# Patient Record
Sex: FEMALE | Race: WHITE | NOT HISPANIC OR LATINO | Employment: FULL TIME | URBAN - METROPOLITAN AREA
[De-identification: names, ages, dates, MRNs, and addresses within clinical notes are randomized per-mention and may not be internally consistent; named-entity substitution may affect disease eponyms.]

---

## 2017-01-24 DIAGNOSIS — E55.9 VITAMIN D DEFICIENCY: Primary | ICD-10-CM

## 2017-01-24 DIAGNOSIS — E28.319 PREMATURE MENOPAUSE: ICD-10-CM

## 2017-01-24 DIAGNOSIS — M85.80 OSTEOPENIA: ICD-10-CM

## 2017-01-25 ENCOUNTER — TELEPHONE (OUTPATIENT)
Dept: INTERNAL MEDICINE | Facility: CLINIC | Age: 47
End: 2017-01-25

## 2017-01-25 NOTE — TELEPHONE ENCOUNTER
Pt informed and states she will call office to get scheduled:    Patient overdue for follow-up - please call to remind her to schedule this. Also due for DEXA and to have vitamin D rechecked, can schedule this for before her next visit with me.   Saturnino Morrell MD

## 2017-02-01 ENCOUNTER — CLINICAL SUPPORT (OUTPATIENT)
Dept: SMOKING CESSATION | Facility: CLINIC | Age: 47
End: 2017-02-01
Payer: COMMERCIAL

## 2017-02-01 DIAGNOSIS — F17.200 NICOTINE DEPENDENCE: Primary | ICD-10-CM

## 2017-02-01 PROCEDURE — 99407 BEHAV CHNG SMOKING > 10 MIN: CPT | Mod: S$GLB,,, | Performed by: INTERNAL MEDICINE

## 2017-04-11 ENCOUNTER — TELEPHONE (OUTPATIENT)
Dept: OBSTETRICS AND GYNECOLOGY | Facility: CLINIC | Age: 47
End: 2017-04-11

## 2017-04-11 NOTE — TELEPHONE ENCOUNTER
----- Message from Laura Alberto sent at 4/11/2017  3:22 PM CDT -----  Contact: Self  Pt is calling in regards of r/s her hormone f/u appt. The pt can be reached at [phone]. Thanks KG

## 2017-07-21 ENCOUNTER — TELEPHONE (OUTPATIENT)
Dept: SPORTS MEDICINE | Facility: CLINIC | Age: 47
End: 2017-07-21

## 2017-07-21 ENCOUNTER — OFFICE VISIT (OUTPATIENT)
Dept: SPORTS MEDICINE | Facility: CLINIC | Age: 47
End: 2017-07-21
Payer: COMMERCIAL

## 2017-07-21 VITALS
SYSTOLIC BLOOD PRESSURE: 147 MMHG | HEIGHT: 63 IN | DIASTOLIC BLOOD PRESSURE: 98 MMHG | BODY MASS INDEX: 26.58 KG/M2 | WEIGHT: 150 LBS | HEART RATE: 92 BPM

## 2017-07-21 DIAGNOSIS — M25.561 ACUTE PAIN OF RIGHT KNEE: Primary | ICD-10-CM

## 2017-07-21 PROCEDURE — 99214 OFFICE O/P EST MOD 30 MIN: CPT | Mod: S$GLB,,, | Performed by: ORTHOPAEDIC SURGERY

## 2017-07-21 PROCEDURE — 99999 PR PBB SHADOW E&M-EST. PATIENT-LVL III: CPT | Mod: PBBFAC,,, | Performed by: ORTHOPAEDIC SURGERY

## 2017-07-21 NOTE — PROGRESS NOTES
"CC: Right knee pain    46 y.o. Female with a history of Right pain who She states that the pain is severe.  OCcured today while moving boxes with her leg, immediate pain and knee gave out.  Pain is located medially and worse with activity.    + mechanical symptoms, no instability    Is affecting ADLs.      Review of Systems   Constitution: Negative. Negative for chills, fever and night sweats.   HENT: Negative for congestion and headaches.    Eyes: Negative for blurred vision, left vision loss and right vision loss.   Cardiovascular: Negative for chest pain and syncope.   Respiratory: Negative for cough and shortness of breath.    Endocrine: Negative for polydipsia, polyphagia and polyuria.   Hematologic/Lymphatic: Negative for bleeding problem. Does not bruise/bleed easily.   Skin: Negative for dry skin, itching and rash.   Musculoskeletal: Negative for falls. Positive for knee pain and muscle weakness.   Gastrointestinal: Negative for abdominal pain and bowel incontinence.   Genitourinary: Negative for bladder incontinence and nocturia.   Neurological: Negative for disturbances in coordination, loss of balance and seizures.   Psychiatric/Behavioral: Negative for depression. The patient does not have insomnia.    Allergic/Immunologic: Negative for hives and persistent infections.     PAST MEDICAL HISTORY:   Past Medical History:   Diagnosis Date    GERD (gastroesophageal reflux disease)     Osteopenia     Thyroid disease     partial thyroidectomy (60%) no meds     PAST SURGICAL HISTORY:   Past Surgical History:   Procedure Laterality Date    APPENDECTOMY       SECTION      HYSTERECTOMY      KNEE SURGERY Left     meniscus repair by Dr Barros    THYROIDECTOMY      "60% removed"     FAMILY HISTORY:   Family History   Problem Relation Age of Onset    Cancer Father     Hypertension Mother     Heart disease Maternal Grandmother     Hypertension Maternal Grandmother      SOCIAL HISTORY:   Social " "History     Social History    Marital status:      Spouse name: N/A    Number of children: N/A    Years of education: N/A     Occupational History    Not on file.     Social History Main Topics    Smoking status: Current Every Day Smoker     Packs/day: 1.00     Years: 30.00    Smokeless tobacco: Not on file      Comment: smoking since age 14    Alcohol use Yes      Comment: ocassional    Drug use: No    Sexual activity: Not on file     Other Topics Concern    Not on file     Social History Narrative    , M x 9, 1 child, smoker.       MEDICATIONS:   Current Outpatient Prescriptions:     benzonatate (TESSALON) 200 MG capsule, Take 1 capsule (200 mg total) by mouth 3 (three) times daily as needed for Cough., Disp: 30 capsule, Rfl: 1    omeprazole (PRILOSEC) 20 MG capsule, Take 20 mg by mouth once daily. , Disp: , Rfl: 11    estradiol valerate (DELESTROGEN) 40 mg/mL injection, Inject 1 mL (40 mg total) into the muscle every 28 days., Disp: 5 mL, Rfl: 12    Current Facility-Administered Medications:     estradiol valerate injection 40 mg, 40 mg, Intramuscular, Q30 Days, Diana Szymanski MD  ALLERGIES: Review of patient's allergies indicates:  No Known Allergies    VITAL SIGNS: BP (!) 147/98   Pulse 92   Ht 5' 3" (1.6 m)   Wt 68 kg (150 lb)   BMI 26.57 kg/m²      PHYSICAL EXAMINATION  VITAL SIGNS: BP (!) 147/98   Pulse 92   Ht 5' 3" (1.6 m)   Wt 68 kg (150 lb)   BMI 26.57 kg/m²    General:  The patient is alert and oriented x 3.  Mood is pleasant.  Observation of ears, eyes and nose reveal no gross abnormalities.  HEENT: NCAT, sclera nonicteric  Lungs: Respirations are equal and unlabored.    Right KNEE EXAMINATION     OBSERVATION / INSPECTION   Gait:   antalgic   Alignment:  Neutral   Scars:   None   Muscle atrophy: Mild  Effusion:  None   Warmth:  None   Discoloration:   none     TENDERNESS / CREPITUS (T / C):          T / C      T / C   Patella   - / -   Lateral " joint line   - / -    Peripatellar medial  -  Medial joint line    + / -    Peripatellar lateral -  Medial plica   - / -    Patellar tendon -   Popliteal fossa  - / -    Quad tendon   -   Gastrocnemius   -   Prepatellar Bursa - / -   Quadricep   -   Tibial tubercle  -  Thigh/hamstring  -   Pes anserine/HS -  Fibula    -   ITB   - / -  Tibia     -   Tib/fib joint  - / -  LCL    -     MFC   - / -   MCL: Proximal  -    LFC   - / -    Distal   -          ROM: (* = pain)  PASSIVE   ACTIVE    Left :   5 / 0 / 145   5 / 0 / 145     Right :    5 / 0 / 135   5 / 0 / 135    Patellofemoral examination:  See above noted areas of tenderness.   Patella position    Subluxation / dislocation: Centered           Sup. / Inf;   Normal   Crepitus (PF):    Absent   Patellar Mobility:       Medial-lateral:   Normal    Superior-inferior:  Normal    Inferior tilt   Normal    Patellar tendon:  Normal   Lateral tilt:    Normal   J-sign:     None   Patellofemoral grind:   No pain       MENISCAL SIGNS:     Pain on terminal extension:  +  Pain on terminal flexion:  +  Brianas maneuver:  + for pain, medially  Squat     + posterior joint pain    LIGAMENT EXAMINATION:  ACL / Lachman:  normal (-1 to 2mm)    PCL-Post.  drawer: normal 0 to 2mm  MCL- Valgus:  normal 0 to 2mm  LCL- Varus:  normal 0 to 2mm  Pivot shift: normal (Equal)   Dial Test: difference c/w other side   At 30° flexion: normal (< 5°)    At 90° flexion: normal (< 5°)   Reverse Pivot Shift:   normal (Equal)     STRENGTH: (* = with pain) PAINFUL SIDE   Quadricep   5/5   Hamstrin/5    EXTREMITY NEURO-VASCULAR EXAMINATION:   Sensation:  Grossly intact to light touch all dermatomal regions.   Motor Function:  Fully intact motor function at hip, knee, foot and ankle    DTRs;  quadriceps and  achilles 2+.  No clonus and downgoing Babinski.    Vascular status:  DP and PT pulses 2+, brisk capillary refill, symmetric.     Other Findings:          ASSESSMENT:    Right Knee   Probable Meniscus tear  medial       PLAN:   MRI right knee  Will place in a brace in interim  All questions were answered, pt will contact us for questions or concerns in the interim.

## 2017-07-21 NOTE — TELEPHONE ENCOUNTER
The patient called in stating that she was inquiring about changing her insurance from personal to workers compensation. I informed her that this would go through her work and through her . I also informed her that Dr. Barros does not see workers compensation cases and that she would need to keep us updated as we would need to get his permission or have her see another physician.     She states that her boss will return on Monday and she will confirm whether this will be workers compensation or not

## 2017-07-31 ENCOUNTER — TELEPHONE (OUTPATIENT)
Dept: SPORTS MEDICINE | Facility: CLINIC | Age: 47
End: 2017-07-31

## 2017-07-31 NOTE — TELEPHONE ENCOUNTER
Pt advised to call office after MRI finished for Dr. Barros to review results.    ----- Message from Amilcar Hernández sent at 7/31/2017  1:14 PM CDT -----  Contact: self   Patient ask for a call in regards to MRI

## 2017-08-01 ENCOUNTER — HOSPITAL ENCOUNTER (OUTPATIENT)
Dept: RADIOLOGY | Facility: HOSPITAL | Age: 47
Discharge: HOME | End: 2017-08-01
Attending: STUDENT IN AN ORGANIZED HEALTH CARE EDUCATION/TRAINING PROGRAM
Payer: COMMERCIAL

## 2017-08-01 ENCOUNTER — TELEPHONE (OUTPATIENT)
Dept: SPORTS MEDICINE | Facility: CLINIC | Age: 47
End: 2017-08-01

## 2017-08-01 DIAGNOSIS — M25.561 ACUTE PAIN OF RIGHT KNEE: ICD-10-CM

## 2017-08-01 PROCEDURE — 73721 MRI JNT OF LWR EXTRE W/O DYE: CPT | Mod: 26,RT,, | Performed by: RADIOLOGY

## 2017-08-01 PROCEDURE — 73721 MRI JNT OF LWR EXTRE W/O DYE: CPT | Mod: TC,RT

## 2017-08-01 NOTE — TELEPHONE ENCOUNTER
Left message stating that MRI results are up for review with Dr Barros, we will be in contact with her plan of care on our next clinic day.  Left office contact number if any questions in the meantime,

## 2017-08-01 NOTE — TELEPHONE ENCOUNTER
----- Message from Sally Bonilla sent at 8/1/2017 10:29 AM CDT -----  Contact: self   Pt called stating she completed her MRI today and is requesting a call back for results. [phone]

## 2017-08-03 ENCOUNTER — TELEPHONE (OUTPATIENT)
Dept: SPORTS MEDICINE | Facility: CLINIC | Age: 47
End: 2017-08-03

## 2017-08-03 DIAGNOSIS — M17.11 OSTEOARTHRITIS OF RIGHT KNEE, UNSPECIFIED OSTEOARTHRITIS TYPE: Primary | ICD-10-CM

## 2017-08-03 NOTE — TELEPHONE ENCOUNTER
I called and reviewed the patient's MRI results with her, she would like to proceed with Synvisc injections as she notes she has tried steroid injections before.

## 2017-08-03 NOTE — TELEPHONE ENCOUNTER
----- Message from Diana Fitzgerald MA sent at 8/2/2017 10:58 AM CDT -----      ----- Message -----  From: Fabiana Barros MD  Sent: 8/1/2017   1:40 PM  To: Fiorella Jung Staff    Please review and call patient    Ritchie cortisone injection vs synvisc

## 2017-08-22 ENCOUNTER — OFFICE VISIT (OUTPATIENT)
Dept: SPORTS MEDICINE | Facility: CLINIC | Age: 47
End: 2017-08-22
Payer: COMMERCIAL

## 2017-08-22 VITALS
SYSTOLIC BLOOD PRESSURE: 132 MMHG | HEIGHT: 63 IN | HEART RATE: 96 BPM | DIASTOLIC BLOOD PRESSURE: 90 MMHG | BODY MASS INDEX: 26.58 KG/M2 | WEIGHT: 150 LBS

## 2017-08-22 DIAGNOSIS — M25.561 ACUTE PAIN OF RIGHT KNEE: ICD-10-CM

## 2017-08-22 DIAGNOSIS — M17.11 OSTEOARTHRITIS OF RIGHT KNEE, UNSPECIFIED OSTEOARTHRITIS TYPE: Primary | ICD-10-CM

## 2017-08-22 PROCEDURE — 99499 UNLISTED E&M SERVICE: CPT | Mod: S$GLB,,, | Performed by: PHYSICIAN ASSISTANT

## 2017-08-22 PROCEDURE — 99999 PR PBB SHADOW E&M-EST. PATIENT-LVL III: CPT | Mod: PBBFAC,,, | Performed by: PHYSICIAN ASSISTANT

## 2017-08-22 PROCEDURE — 20610 DRAIN/INJ JOINT/BURSA W/O US: CPT | Mod: RT,S$GLB,, | Performed by: PHYSICIAN ASSISTANT

## 2017-08-22 NOTE — PROGRESS NOTES
Patient is here for follow up of knee arthritis. Pt is requesting synvisc injection #1.  PMFH reviewed per encounter record. Has failed other conservative modalities including NSAIDS, activity modification, weight loss.    She has not received synvisc injections in the past.       PHYSICAL EXAMINATION:     General: The patient is alert and oriented x 3. Mood is pleasant.   Observation of ears, eyes and nose reveals no gross abnormalities. No   labored breathing observed.     No signs of infection or adverse reaction to knee.    PROCEDURE NOTE:   right KNEE INJECTION  After time out was performed, including verification of patient ID, procedure, site and side, availability of information and equipment, review of safety issues, and agreement with consent, the procedure site was marked and the patient was prepped aseptically.    The right knee was prepped with betadine and injected under sterile conditions from an anterolateral approach with patient supine and then 2cc of Synvisc was injected into right knee joint . Patient tolerated the injection well. The patient had no adverse reactions to the medication.      RTC 1 week for 2nd injection.  All questions were answered, pt will contact us for questions or concerns in the interim.

## 2017-08-22 NOTE — LETTER
August 22, 2017      Justen Mcdonald MD  [address]  [facility] [address]           [facility]  [address]  [facility] [address]  Phone: [phone]          Patient: Diana Shea   MR Number: [id number]   YOB: 1970   Date of Visit: 8/22/2017       Dear Dr. Justen Mcdonald:    Thank you for referring Diana Shea to me for evaluation. Attached you will find relevant portions of my assessment and plan of care.    If you have questions, please do not hesitate to call me. I look forward to following Diana Shea along with you.    Sincerely,    Ramna Goodman III, PA-C    Enclosure  CC:  No Recipients    If you would like to receive this communication electronically, please contact [email or web address] or [phone] to request more information on [employer] Link access.    For providers and/or their staff who would like to refer a patient to [facility], please contact us through our one-stop-shop provider referral line, [facility], at [phone].    If you feel you have received this communication in error or would no longer like to receive these types of communications, please e-mail [email or web address]

## 2017-08-23 ENCOUNTER — CLINICAL SUPPORT (OUTPATIENT)
Dept: SMOKING CESSATION | Facility: CLINIC | Age: 47
End: 2017-08-23
Payer: COMMERCIAL

## 2017-08-23 DIAGNOSIS — F17.200 NICOTINE DEPENDENCE: Primary | ICD-10-CM

## 2017-08-23 PROCEDURE — 99407 BEHAV CHNG SMOKING > 10 MIN: CPT | Mod: S$GLB,,, | Performed by: INTERNAL MEDICINE

## 2017-08-28 ENCOUNTER — PATIENT MESSAGE (OUTPATIENT)
Dept: SPORTS MEDICINE | Facility: CLINIC | Age: 47
End: 2017-08-28

## 2017-08-28 ENCOUNTER — NURSE TRIAGE (OUTPATIENT)
Dept: ADMINISTRATIVE | Facility: CLINIC | Age: 47
End: 2017-08-28

## 2017-08-28 ENCOUNTER — OFFICE VISIT (OUTPATIENT)
Dept: SPORTS MEDICINE | Facility: CLINIC | Age: 47
End: 2017-08-28
Payer: COMMERCIAL

## 2017-08-28 VITALS
DIASTOLIC BLOOD PRESSURE: 81 MMHG | BODY MASS INDEX: 23.56 KG/M2 | HEART RATE: 98 BPM | WEIGHT: 138 LBS | HEIGHT: 64 IN | SYSTOLIC BLOOD PRESSURE: 122 MMHG

## 2017-08-28 DIAGNOSIS — M25.561 RIGHT KNEE PAIN, UNSPECIFIED CHRONICITY: ICD-10-CM

## 2017-08-28 DIAGNOSIS — M17.11 OSTEOARTHRITIS OF RIGHT KNEE, UNSPECIFIED OSTEOARTHRITIS TYPE: Primary | ICD-10-CM

## 2017-08-28 PROCEDURE — 99999 PR PBB SHADOW E&M-EST. PATIENT-LVL III: CPT | Mod: PBBFAC,,, | Performed by: PHYSICIAN ASSISTANT

## 2017-08-28 PROCEDURE — 20610 DRAIN/INJ JOINT/BURSA W/O US: CPT | Mod: RT,S$GLB,, | Performed by: PHYSICIAN ASSISTANT

## 2017-08-28 PROCEDURE — 99499 UNLISTED E&M SERVICE: CPT | Mod: S$GLB,,, | Performed by: PHYSICIAN ASSISTANT

## 2017-08-28 NOTE — PROGRESS NOTES
Patient is here for follow up of knee arthritis. Pt is requesting synvisc injection #2.  PMFH reviewed per encounter record. Has failed other conservative modalities including NSAIDS, activity modification, weight loss.    She tolerated last synvisc injections okay with minimal pain relief.        PHYSICAL EXAMINATION:     General: The patient is alert and oriented x 3. Mood is pleasant.   Observation of ears, eyes and nose reveals no gross abnormalities. No   labored breathing observed.     No signs of infection or adverse reaction to knee.    PROCEDURE NOTE:   right KNEE INJECTION  After time out was performed, including verification of patient ID, procedure, site and side, availability of information and equipment, review of safety issues, and agreement with consent, the procedure site was marked and the patient was prepped aseptically.    The right knee was prepped with betadine and injected under sterile conditions from an kirstin medial approach with patient supine and then 2cc of Synvisc was injected into right knee joint . Patient tolerated the injection well. The patient had no adverse reactions to the medication.      RTC 1 week for 3rd injection.  All questions were answered, pt will contact us for questions or concerns in the interim.

## 2017-08-29 ENCOUNTER — TELEPHONE (OUTPATIENT)
Dept: SPORTS MEDICINE | Facility: CLINIC | Age: 47
End: 2017-08-29

## 2017-08-29 NOTE — TELEPHONE ENCOUNTER
Pt had injection in Right knee today- has been elevating and icing it ( about q 3 hrs ) - knee red/swollen- she was told by the PA who did the injection to report this. Has pain rated a 6 at this time.  Reason for Disposition   Nursing judgment    Protocols used: ST NO GUIDELINE OR REFERENCE [id number]    Referred to on call.

## 2017-08-29 NOTE — TELEPHONE ENCOUNTER
Spoke to patient about her right knee which became red and swollen after her synvisc injection yesterday. She states that the knee has been gradually improving since last night. Feeling better. The knee now only has a slight erythematous color per patient. Swelling is improving. She denies fever, chills, nausea, vomiting. I offered her to come into clinic today for evaluation and drainage and the patient refused. She will watch the knee and let us know if things worsens. She understands that she can go to the ED at anytime if knee worsens.

## 2017-08-30 ENCOUNTER — TELEPHONE (OUTPATIENT)
Dept: OBSTETRICS AND GYNECOLOGY | Facility: CLINIC | Age: 47
End: 2017-08-30

## 2017-08-30 NOTE — TELEPHONE ENCOUNTER
----- Message from Italia Husain sent at 8/30/2017 12:52 PM CDT -----  Contact: pt  x  1st Request  _  2nd Request  _  3rd Request      Who:pt    Why: pt calling to schedule appointment for hormone inj    What Number to Call Back: [phone]    When to Expect a call back: (Before the end of the day)   -- if call after 3:00 call back will be tomorrow.

## 2017-09-06 ENCOUNTER — OFFICE VISIT (OUTPATIENT)
Dept: OBSTETRICS AND GYNECOLOGY | Facility: CLINIC | Age: 47
End: 2017-09-06
Attending: OBSTETRICS & GYNECOLOGY
Payer: COMMERCIAL

## 2017-09-06 VITALS
BODY MASS INDEX: 24.65 KG/M2 | WEIGHT: 144.38 LBS | HEIGHT: 64 IN | SYSTOLIC BLOOD PRESSURE: 124 MMHG | DIASTOLIC BLOOD PRESSURE: 82 MMHG

## 2017-09-06 DIAGNOSIS — D22.9 MELANOCYTIC NEVUS, UNSPECIFIED LOCATION: ICD-10-CM

## 2017-09-06 DIAGNOSIS — Z12.31 VISIT FOR SCREENING MAMMOGRAM: Primary | ICD-10-CM

## 2017-09-06 DIAGNOSIS — Z78.0 MENOPAUSE: ICD-10-CM

## 2017-09-06 PROCEDURE — 3008F BODY MASS INDEX DOCD: CPT | Mod: S$GLB,,, | Performed by: OBSTETRICS & GYNECOLOGY

## 2017-09-06 PROCEDURE — 99214 OFFICE O/P EST MOD 30 MIN: CPT | Mod: 25,S$GLB,, | Performed by: OBSTETRICS & GYNECOLOGY

## 2017-09-06 PROCEDURE — 96372 THER/PROPH/DIAG INJ SC/IM: CPT | Mod: S$GLB,,, | Performed by: OBSTETRICS & GYNECOLOGY

## 2017-09-06 PROCEDURE — 99999 PR PBB SHADOW E&M-EST. PATIENT-LVL III: CPT | Mod: PBBFAC,,, | Performed by: OBSTETRICS & GYNECOLOGY

## 2017-09-06 RX ORDER — TESTOSTERONE CYPIONATE 200 MG/ML
50 INJECTION, SOLUTION INTRAMUSCULAR ONCE
Status: COMPLETED | OUTPATIENT
Start: 2017-09-06 | End: 2017-09-06

## 2017-09-06 RX ADMIN — TESTOSTERONE CYPIONATE 50 MG: 200 INJECTION, SOLUTION INTRAMUSCULAR at 10:09

## 2017-09-06 NOTE — PROGRESS NOTES
Subjective:       Patient ID: Diana Shae is a 46 y.o. female.    Chief Complaint:  Follow-up      History of Present Illness  HPI  This 46 yr old female is here for routine exam and recieves injections every 8 to 10 months and lasts.  She is due now.  She also is due for her mammogram.  She had pap about 2 yrs ago .  We discussed new recs  She has a mole along her left bra line that is getting irritated.  She wears underwire bras and is irritating this    GYN & OB History  No LMP recorded. Patient has had a hysterectomy.   Date of Last Pap: No result found    OB History   No data available       Review of Systems  Review of Systems        Objective:    Physical Exam:        Pulmonary/Chest:                              the mole is nonpigmented     Assessment:        1. Visit for screening mammogram    2. Menopause               Plan:      We will give testosterone 50 added to the estradiol 10 today  She may come back and get 100 in few months if she does well.  We discussed risks and benefits and she agrees.  Refer to derm for mole under breast and wear non underwire bras until she sees Derm

## 2017-12-21 ENCOUNTER — HOSPITAL ENCOUNTER (EMERGENCY)
Facility: HOSPITAL | Age: 47
Discharge: HOME | End: 2017-12-21
Attending: EMERGENCY MEDICINE
Payer: COMMERCIAL

## 2017-12-21 VITALS
WEIGHT: 147 LBS | SYSTOLIC BLOOD PRESSURE: 120 MMHG | HEART RATE: 72 BPM | BODY MASS INDEX: 25.1 KG/M2 | TEMPERATURE: 98 F | DIASTOLIC BLOOD PRESSURE: 69 MMHG | RESPIRATION RATE: 20 BRPM | HEIGHT: 64 IN | OXYGEN SATURATION: 95 %

## 2017-12-21 DIAGNOSIS — R07.9 CHEST PAIN: ICD-10-CM

## 2017-12-21 LAB
ALBUMIN SERPL BCP-MCNC: 3.7 G/DL
ALP SERPL-CCNC: 48 U/L
ALT SERPL W/O P-5'-P-CCNC: 15 U/L
ANION GAP SERPL CALC-SCNC: 9 MMOL/L
AST SERPL-CCNC: 19 U/L
BASOPHILS # BLD AUTO: 0.06 K/UL
BASOPHILS NFR BLD: 0.8 %
BILIRUB SERPL-MCNC: 0.4 MG/DL
BNP SERPL-MCNC: 14 PG/ML
BUN SERPL-MCNC: 12 MG/DL
CALCIUM SERPL-MCNC: 9.6 MG/DL
CHLORIDE SERPL-SCNC: 105 MMOL/L
CO2 SERPL-SCNC: 25 MMOL/L
CREAT SERPL-MCNC: 0.6 MG/DL
D DIMER PPP IA.FEU-MCNC: 0.26 MG/L FEU
DIFFERENTIAL METHOD: ABNORMAL
EOSINOPHIL # BLD AUTO: 0.3 K/UL
EOSINOPHIL NFR BLD: 3.6 %
ERYTHROCYTE [DISTWIDTH] IN BLOOD BY AUTOMATED COUNT: 13.2 %
EST. GFR  (AFRICAN AMERICAN): >60 ML/MIN/1.73 M^2
EST. GFR  (NON AFRICAN AMERICAN): >60 ML/MIN/1.73 M^2
GLUCOSE SERPL-MCNC: 92 MG/DL
HCT VFR BLD AUTO: 45.8 %
HGB BLD-MCNC: 15.6 G/DL
IMM GRANULOCYTES # BLD AUTO: 0.02 K/UL
IMM GRANULOCYTES NFR BLD AUTO: 0.3 %
LYMPHOCYTES # BLD AUTO: 2.2 K/UL
LYMPHOCYTES NFR BLD: 29.5 %
MCH RBC QN AUTO: 31.5 PG
MCHC RBC AUTO-ENTMCNC: 34.1 G/DL
MCV RBC AUTO: 93 FL
MONOCYTES # BLD AUTO: 0.6 K/UL
MONOCYTES NFR BLD: 7.8 %
NEUTROPHILS # BLD AUTO: 4.2 K/UL
NEUTROPHILS NFR BLD: 58 %
NRBC BLD-RTO: 0 /100 WBC
PLATELET # BLD AUTO: 317 K/UL
PMV BLD AUTO: 10.4 FL
POTASSIUM SERPL-SCNC: 4.5 MMOL/L
PROT SERPL-MCNC: 6.8 G/DL
RBC # BLD AUTO: 4.95 M/UL
SODIUM SERPL-SCNC: 139 MMOL/L
TROPONIN I SERPL DL<=0.01 NG/ML-MCNC: <0.006 NG/ML
TROPONIN I SERPL DL<=0.01 NG/ML-MCNC: <0.006 NG/ML
WBC # BLD AUTO: 7.28 K/UL

## 2017-12-21 PROCEDURE — 25000003 PHARM REV CODE 250: Performed by: EMERGENCY MEDICINE

## 2017-12-21 PROCEDURE — 93005 ELECTROCARDIOGRAM TRACING: CPT

## 2017-12-21 PROCEDURE — 80053 COMPREHEN METABOLIC PANEL: CPT

## 2017-12-21 PROCEDURE — 99284 EMERGENCY DEPT VISIT MOD MDM: CPT | Mod: ,,, | Performed by: EMERGENCY MEDICINE

## 2017-12-21 PROCEDURE — 83880 ASSAY OF NATRIURETIC PEPTIDE: CPT

## 2017-12-21 PROCEDURE — 93010 ELECTROCARDIOGRAM REPORT: CPT | Mod: ,,, | Performed by: INTERNAL MEDICINE

## 2017-12-21 PROCEDURE — 85379 FIBRIN DEGRADATION QUANT: CPT

## 2017-12-21 PROCEDURE — 85025 COMPLETE CBC W/AUTO DIFF WBC: CPT

## 2017-12-21 PROCEDURE — 84484 ASSAY OF TROPONIN QUANT: CPT | Mod: 91

## 2017-12-21 PROCEDURE — 99284 EMERGENCY DEPT VISIT MOD MDM: CPT | Mod: 25

## 2017-12-21 RX ORDER — NITROGLYCERIN 0.4 MG/1
0.4 TABLET SUBLINGUAL EVERY 5 MIN PRN
Status: DISCONTINUED | OUTPATIENT
Start: 2017-12-21 | End: 2017-12-21 | Stop reason: HOSPADM

## 2017-12-21 RX ORDER — FAMOTIDINE 20 MG/1
20 TABLET, FILM COATED ORAL
Status: DISCONTINUED | OUTPATIENT
Start: 2017-12-21 | End: 2017-12-21 | Stop reason: HOSPADM

## 2017-12-21 RX ORDER — ASPIRIN 325 MG
325 TABLET ORAL
Status: COMPLETED | OUTPATIENT
Start: 2017-12-21 | End: 2017-12-21

## 2017-12-21 RX ORDER — NITROGLYCERIN 0.4 MG/1
TABLET SUBLINGUAL
Status: DISCONTINUED
Start: 2017-12-21 | End: 2017-12-21 | Stop reason: HOSPADM

## 2017-12-21 RX ORDER — FAMOTIDINE 20 MG/1
20 TABLET, FILM COATED ORAL 2 TIMES DAILY
Qty: 20 TABLET | Refills: 0 | Status: SHIPPED | OUTPATIENT
Start: 2017-12-21 | End: 2018-12-21

## 2017-12-21 RX ADMIN — NITROGLYCERIN 0.4 MG: 0.4 TABLET SUBLINGUAL at 07:12

## 2017-12-21 RX ADMIN — ALUMINUM HYDROXIDE, MAGNESIUM HYDROXIDE, AND SIMETHICONE 50 ML: 200; 200; 20 SUSPENSION ORAL at 09:12

## 2017-12-21 RX ADMIN — ASPIRIN 325 MG ORAL TABLET 325 MG: 325 PILL ORAL at 07:12

## 2017-12-21 NOTE — PROVIDER PROGRESS NOTES - EMERGENCY DEPT.
Encounter Date: 12/21/2017    ED Physician Progress Notes         EKG - STEMI Decision  Initial Reading: No STEMI present.  Response: No Action Needed.

## 2017-12-21 NOTE — ED PROVIDER NOTES
"Encounter Date: 2017    SCRIBE #1 NOTE: I, Shahbaz Cardenas, am scribing for, and in the presence of,  Dr. Campos . I have scribed the entire note.       History     Chief Complaint   Patient presents with    Chest Pain     c/o substernal CP and right arm numbness, rates pain as 4/10, pain radiates to right side of neck, denies dizziness, N/V, denies cardiac hx      Time patient was seen by the provider: 6:57 AM    The patient is a 47 y.o. female with hx of: GERD, and thyroid disease that presents to the ED with a complaint of chest pain that began suddenly between 5:10AM-5:15AM while getting dressed for work. She endorses feeling SOB, and nauseous during the event and numbness/tingling down her RUE. She describes the pain as pressure in her chest and rates it as a 6/10. Pt denies fever, chills, sweating during the event, and abdominal pain. Of note: Pt smokes. She denies family hx of cardiac issues.         The history is provided by the patient.     Review of patient's allergies indicates:  No Known Allergies  Past Medical History:   Diagnosis Date    GERD (gastroesophageal reflux disease)     Osteopenia     Thyroid disease     partial thyroidectomy (60%) no meds     Past Surgical History:   Procedure Laterality Date    APPENDECTOMY       SECTION      HYSTERECTOMY      KNEE SURGERY Left     meniscus repair by Dr Barros    THYROIDECTOMY      "60% removed"     Family History   Problem Relation Age of Onset    Cancer Father     Hypertension Mother     Heart disease Maternal Grandmother     Hypertension Maternal Grandmother      Social History   Substance Use Topics    Smoking status: Current Every Day Smoker     Packs/day: 1.00    Smokeless tobacco: Not on file    Alcohol use Yes     Review of Systems   Constitutional: Negative for chills, diaphoresis and fever.   HENT: Negative for sore throat.    Respiratory: Positive for shortness of breath.    Cardiovascular: Positive for chest " pain.   Gastrointestinal: Positive for nausea. Negative for abdominal pain.   Genitourinary: Negative for dysuria.   Musculoskeletal: Negative for back pain.   Skin: Negative for rash.   Neurological: Positive for numbness (RUE). Negative for weakness.        Tingling in RUE.    Hematological: Does not bruise/bleed easily.       Physical Exam     Initial Vitals [12/21/17 0548]   BP Pulse Resp Temp SpO2   (!) 173/88 102 18 98.2 °F (36.8 °C) 99 %      MAP       116.33         Physical Exam    Nursing note and vitals reviewed.  Constitutional: She appears well-developed and well-nourished. She is not diaphoretic. No distress.   HENT:   Head: Normocephalic and atraumatic.   Eyes: EOM are normal. Pupils are equal, round, and reactive to light.   Neck: Normal range of motion. Neck supple.   Cardiovascular: Normal rate, regular rhythm, normal heart sounds and intact distal pulses.   Pulmonary/Chest: Breath sounds normal. No respiratory distress.   Abdominal: Soft. She exhibits no distension. There is no tenderness.   Musculoskeletal: Normal range of motion. She exhibits no edema.   Neurological: She is alert and oriented to person, place, and time.         ED Course   Procedures  Labs Reviewed   CBC W/ AUTO DIFFERENTIAL - Abnormal; Notable for the following:        Result Value    MCH 31.5 (*)     All other components within normal limits   COMPREHENSIVE METABOLIC PANEL - Abnormal; Notable for the following:     Alkaline Phosphatase 48 (*)     All other components within normal limits   TROPONIN I   B-TYPE NATRIURETIC PEPTIDE   TROPONIN I   D DIMER, QUANTITATIVE   D DIMER, QUANTITATIVE    Narrative:     add on D Dimer, quant Order #[id number] per Dr Campos @  12/21/2017    09:53      EKG Readings: (Independently Interpreted)   Normal Sinus Rhythm rate of 100. No ST elevation or depression.           Medical Decision Making:   History:   Old Medical Records: I decided to obtain old medical records.  Initial Assessment:   47  y.o. female with chest discomfort that is suspicious for cardiac etiology. Will send cardiac enzymes and likely observe. My initial differential diagnoses include but are not limited to: ACS, PNA, esophagitis, and PE.     Independently Interpreted Test(s):   I have ordered and independently interpreted EKG Reading(s) - see prior notes  Clinical Tests:   Lab Tests: Ordered and Reviewed  Radiological Study: Ordered and Reviewed  Medical Tests: Ordered and Reviewed  ED Management:  Two sets of cardiac enzymes are normal including d -dimmer. GI cocktail improved her discomfort some, while NTG did nothing. I do not feel this is cardiac in nature. Will discharge with Pepcid.             Scribe Attestation:   Scribe #1: I performed the above scribed service and the documentation accurately describes the services I performed. I attest to the accuracy of the note.            ED Course      Clinical Impression:   The encounter diagnosis was Chest pain.    Disposition:   Disposition: Discharged  Condition: Stable       I, Dr. Michael Campos III, personally performed the services described in this documentation. All medical record entries made by the scribe were at my direction and in my presence.  I have reviewed the chart and agree that the record reflects my personal performance and is accurate and complete. Michael Campos III, MD.  2:35 PM 12/21/2017                   Michael Campos III, MD  12/21/17 1399

## 2017-12-21 NOTE — ED TRIAGE NOTES
Diana Shea, a 47 y.o. female presents to the ED c/o midsternal chest pain radiating to right arm and neck, described as pressure, pain began after swallowing her vitamins. Also c/o nausea      Chief Complaint   Patient presents with    Chest Pain     c/o substernal CP and right arm numbness, rates pain as 4/10, pain radiates to right side of neck, denies dizziness, N/V, denies cardiac hx      Review of patient's allergies indicates:  No Known Allergies  Past Medical History:   Diagnosis Date    GERD (gastroesophageal reflux disease)     Osteopenia     Thyroid disease 2010    partial thyroidectomy (60%) no meds     Adult Physical Assessment  LOC: Diana Shea, 47 y.o. female verified via two identifiers.  The patient is awake, alert, oriented and speaking appropriately at this time.  APPEARANCE: Patient resting comfortably and appears to be in no acute distress at this time. Patient is clean and well groomed, patient's clothing is properly fastened.  SKIN:The skin is warm and dry, color consistent with ethnicity, patient has normal skin turgor and moist mucus membranes, skin intact, no breakdown or brusing noted.  MUSCULOSKELETAL: Patient moving all extremities well, no obvious swelling or deformities noted. Tenderness to midsternal chest wall.  RESPIRATORY: Airway is open and patent, respirations are spontaneous, patient has a normal effort and rate, no accessory muscle use noted.  CARDIAC: Patient has a normal rate and rhythm, no periphreal edema noted in any extremity, capillary refill < 3 seconds in all extremities  ABDOMEN: Soft and non tender to palpation, no abdominal distention noted. Bowel sounds present in all four quadrants.  NEUROLOGIC: Eyes open spontaneously, behavior appropriate to situation, follows commands, facial expression symmetrical, bilateral hand grasp equal and even, purposeful motor response noted, normal sensation in all extremities when touched with a finger.

## 2017-12-26 ENCOUNTER — TELEPHONE (OUTPATIENT)
Dept: INTERNAL MEDICINE | Facility: CLINIC | Age: 47
End: 2017-12-26

## 2017-12-26 DIAGNOSIS — Z00.00 ANNUAL PHYSICAL EXAM: Primary | ICD-10-CM

## 2017-12-26 NOTE — TELEPHONE ENCOUNTER
Patient overdue for follow up and seen recently in ER - please contact to schedule for follow up with labs prior.

## 2018-06-04 ENCOUNTER — TELEPHONE (OUTPATIENT)
Dept: INTERNAL MEDICINE | Facility: CLINIC | Age: 48
End: 2018-06-04

## 2018-06-05 NOTE — TELEPHONE ENCOUNTER
Patient canceled last annual exam and labs, hasn't been seen since 1/2017 - please check in with her about rescheduling this. Not urgent but does need monitoring of her thyroid

## 2018-12-28 PROBLEM — K21.9 GERD (GASTROESOPHAGEAL REFLUX DISEASE): Status: ACTIVE | Noted: 2018-12-28

## 2018-12-28 PROBLEM — R51.9 HEADACHE: Status: ACTIVE | Noted: 2018-12-28

## 2018-12-28 PROBLEM — R07.9 CHEST PAIN: Status: ACTIVE | Noted: 2018-12-28

## 2019-04-04 ENCOUNTER — CLINICAL SUPPORT (OUTPATIENT)
Dept: SMOKING CESSATION | Facility: CLINIC | Age: 49
End: 2019-04-04
Payer: COMMERCIAL

## 2019-04-04 DIAGNOSIS — F17.210 HEAVY CIGARETTE SMOKER (20-39 PER DAY): Primary | ICD-10-CM

## 2019-04-04 PROCEDURE — 99404 PR PREVENT COUNSEL,INDIV,60 MIN: ICD-10-PCS | Mod: S$GLB,,,

## 2019-04-04 PROCEDURE — 99404 PREV MED CNSL INDIV APPRX 60: CPT | Mod: S$GLB,,,

## 2019-04-04 RX ORDER — IBUPROFEN 200 MG
1 TABLET ORAL DAILY
Qty: 14 PATCH | Refills: 0 | Status: SHIPPED | OUTPATIENT
Start: 2019-04-04 | End: 2019-04-04

## 2019-04-04 RX ORDER — MICONAZOLE NITRATE 2 %
CREAM (GRAM) TOPICAL
Qty: 380 EACH | Refills: 0 | Status: SHIPPED | OUTPATIENT
Start: 2019-04-04 | End: 2019-04-04

## 2019-04-04 RX ORDER — MICONAZOLE NITRATE 2 %
CREAM (GRAM) TOPICAL
Qty: 380 EACH | Refills: 0 | Status: SHIPPED | OUTPATIENT
Start: 2019-04-04 | End: 2019-04-18 | Stop reason: SDUPTHER

## 2019-04-04 RX ORDER — NAPROXEN SODIUM 220 MG/1
81 TABLET, FILM COATED ORAL DAILY
COMMUNITY

## 2019-04-04 RX ORDER — IBUPROFEN 200 MG
1 TABLET ORAL DAILY
Qty: 14 PATCH | Refills: 0 | Status: SHIPPED | OUTPATIENT
Start: 2019-04-04 | End: 2019-04-18 | Stop reason: SDUPTHER

## 2019-04-04 RX ORDER — PROGESTERONE 100 MG/1
100 CAPSULE ORAL DAILY
COMMUNITY

## 2019-04-04 RX ORDER — ESTRADIOL 0.05 MG/D
1 PATCH TRANSDERMAL
COMMUNITY

## 2019-04-04 NOTE — Clinical Note
Patient currently smoking 1.5 packs per day and will begin 1:1 cessation therapy with CTTS. Patient will begin prescribed tobacco cessation medication regimen of 21mg nicotine patch, along with 2mg nicotine gum daily as directed.

## 2019-04-18 ENCOUNTER — CLINICAL SUPPORT (OUTPATIENT)
Dept: SMOKING CESSATION | Facility: CLINIC | Age: 49
End: 2019-04-18
Payer: COMMERCIAL

## 2019-04-18 DIAGNOSIS — F17.210 HEAVY CIGARETTE SMOKER (20-39 PER DAY): ICD-10-CM

## 2019-04-18 DIAGNOSIS — F17.210 SMOKES 1/2 PACK/DAY OR LESS: Primary | ICD-10-CM

## 2019-04-18 PROCEDURE — 99402 PR PREVENT COUNSEL,INDIV,30 MIN: ICD-10-PCS | Mod: S$GLB,,,

## 2019-04-18 PROCEDURE — 99402 PREV MED CNSL INDIV APPRX 30: CPT | Mod: S$GLB,,,

## 2019-04-18 RX ORDER — IBUPROFEN 200 MG
1 TABLET ORAL DAILY
Qty: 14 PATCH | Refills: 0 | Status: SHIPPED | OUTPATIENT
Start: 2019-04-18

## 2019-04-18 RX ORDER — MICONAZOLE NITRATE 2 %
CREAM (GRAM) TOPICAL
Qty: 380 EACH | Refills: 0 | Status: SHIPPED | OUTPATIENT
Start: 2019-04-18

## 2019-04-18 NOTE — Clinical Note
4-12 cig/day; Patient remains on prescribed tobacco cessation medication regimen of 21 mg patch, along with 2mg nicotine gum (about 12/day) as needed to replace cigarettes, without any negative side effects at this time; pt is excited about progress and is focused on continued forward progress

## 2019-04-18 NOTE — PROGRESS NOTES
Individual Follow-Up Form    4/18/2019    Quit Date:     Clinical Status of Patient: Outpatient    Length of Service: 30 minutes    Continuing Medication: yes  Patches or Nicotine gum    Other Medications:      Target Symptoms: Withdrawal and medication side effects. The following were  rated moderate (3) to severe (4) on TCRS:  · Moderate (3): none  · Severe (4): none    Comments: 4-12 cig/day; Patient remains on prescribed tobacco cessation medication regimen of 21 mg patch, along with 2mg nicotine gum (about 12/day) as needed to replace cigarettes, without any negative side effects at this time; pt is excited about progress and is focused on continued forward progress      Diagnosis: F17.210    Next Visit: 2 weeks

## 2019-05-20 ENCOUNTER — TELEPHONE (OUTPATIENT)
Dept: SMOKING CESSATION | Facility: CLINIC | Age: 49
End: 2019-05-20

## 2020-04-28 ENCOUNTER — TELEPHONE (OUTPATIENT)
Dept: SMOKING CESSATION | Facility: CLINIC | Age: 50
End: 2020-04-28

## 2020-06-01 ENCOUNTER — CLINICAL SUPPORT (OUTPATIENT)
Dept: SMOKING CESSATION | Facility: CLINIC | Age: 50
End: 2020-06-01
Payer: COMMERCIAL

## 2020-06-01 DIAGNOSIS — F17.200 NICOTINE DEPENDENCE: Primary | ICD-10-CM

## 2020-06-01 PROCEDURE — 99407 BEHAV CHNG SMOKING > 10 MIN: CPT | Mod: S$GLB,,, | Performed by: INTERNAL MEDICINE

## 2020-06-01 PROCEDURE — 99407 PR TOBACCO USE CESSATION INTENSIVE >10 MINUTES: ICD-10-PCS | Mod: S$GLB,,, | Performed by: INTERNAL MEDICINE

## 2020-06-01 NOTE — PROGRESS NOTES
Spoke with patient today in regard to smoking cessation progress for 12 month telephone follow up, she states not tobacco free. Patient states not being able to cut down on tobacco intake and no interest in returning to the program at this time. Informed patient of benefit period and contact information if any further help or support is needed. Will resolve episode and complete smart form for Quit attempt #2.

## 2021-09-30 PROBLEM — R49.8 IMPAIRED VOCAL QUALITY: Status: ACTIVE | Noted: 2021-09-30

## 2021-09-30 PROBLEM — R49.0 HOARSENESS: Status: ACTIVE | Noted: 2021-09-30

## 2021-09-30 PROBLEM — R06.02 SHORTNESS OF BREATH: Status: ACTIVE | Noted: 2021-09-30

## 2024-01-18 ENCOUNTER — TELEPHONE (OUTPATIENT)
Dept: NEUROSURGERY | Facility: CLINIC | Age: 54
End: 2024-01-18

## 2024-01-18 NOTE — TELEPHONE ENCOUNTER
----- Message from Berta Loera sent at 1/17/2024 10:41 AM CST -----  Regarding: Referral  Good morning,    Dr. Silvio Felix would like to refer the following patient to Dr. Cloud in the Neurology department. The patients diagnosis is Vertebrogenic low back pain. I have scanned the patients referral and records into .     Thanks,    Berta Gray

## 2024-01-22 ENCOUNTER — OFFICE VISIT (OUTPATIENT)
Dept: NEUROSURGERY | Facility: CLINIC | Age: 54
End: 2024-01-22
Payer: COMMERCIAL

## 2024-01-22 VITALS — SYSTOLIC BLOOD PRESSURE: 144 MMHG | DIASTOLIC BLOOD PRESSURE: 91 MMHG | HEART RATE: 74 BPM

## 2024-01-22 DIAGNOSIS — M54.50 DORSALGIA OF LUMBAR REGION: Primary | ICD-10-CM

## 2024-01-22 PROCEDURE — 99999 PR PBB SHADOW E&M-EST. PATIENT-LVL III: CPT | Mod: PBBFAC,,, | Performed by: STUDENT IN AN ORGANIZED HEALTH CARE EDUCATION/TRAINING PROGRAM

## 2024-01-22 PROCEDURE — 3080F DIAST BP >= 90 MM HG: CPT | Mod: CPTII,S$GLB,, | Performed by: STUDENT IN AN ORGANIZED HEALTH CARE EDUCATION/TRAINING PROGRAM

## 2024-01-22 PROCEDURE — 99203 OFFICE O/P NEW LOW 30 MIN: CPT | Mod: S$GLB,,, | Performed by: STUDENT IN AN ORGANIZED HEALTH CARE EDUCATION/TRAINING PROGRAM

## 2024-01-22 PROCEDURE — 1159F MED LIST DOCD IN RCRD: CPT | Mod: CPTII,S$GLB,, | Performed by: STUDENT IN AN ORGANIZED HEALTH CARE EDUCATION/TRAINING PROGRAM

## 2024-01-22 PROCEDURE — 3077F SYST BP >= 140 MM HG: CPT | Mod: CPTII,S$GLB,, | Performed by: STUDENT IN AN ORGANIZED HEALTH CARE EDUCATION/TRAINING PROGRAM

## 2024-01-22 RX ORDER — HYDROCODONE BITARTRATE AND ACETAMINOPHEN 7.5; 325 MG/1; MG/1
TABLET ORAL
COMMUNITY
Start: 2023-10-27

## 2024-01-22 RX ORDER — TIZANIDINE HYDROCHLORIDE 4 MG/1
CAPSULE, GELATIN COATED ORAL
COMMUNITY

## 2024-01-22 RX ORDER — AMITRIPTYLINE HYDROCHLORIDE 25 MG/1
TABLET, FILM COATED ORAL
COMMUNITY
Start: 2023-08-03

## 2024-01-22 NOTE — PROGRESS NOTES
Neurosurgery  History & Physical    SUBJECTIVE:     Chief Complaint: Low back and RLE radicular pain    History of Present Illness:  53 F smoker (1ppd) presents for eval of chronic axial low back pain and RLE radicular pain.  Her low back pain is made worse with standing/sitting/climbing stairs.  She also endorses pain which radiates down her right leg into sole of her foot.  She has numbness in the sole of her right foot.  She has no LLE symptoms.  She has been to PT and seen pain mgmt for injections.  She noted that the greatest benefit was an IT band release which was done recently.      Review of patient's allergies indicates:  No Known Allergies    Current Outpatient Medications   Medication Sig Dispense Refill    amitriptyline (ELAVIL) 25 MG tablet Every Day      aspirin 81 MG Chew Take 81 mg by mouth once daily.      estradiol 0.05 mg/24 hr td ptwk (ESTRADIOL TRANSDERMAL PATCH) 0.05 mg/24 hr PTWK Place 1 patch onto the skin every 7 days.      HYDROcodone-acetaminophen (NORCO) 7.5-325 mg per tablet TAKE ONE TABLET BY MOUTH EVERY 4-6 HOURS AS NEEDED FOR POST OP PAIN      progesterone (PROMETRIUM) 100 MG capsule Take 100 mg by mouth once daily.      tiZANidine 4 mg Cap TAKE 1-2 CAPSULES BY MOUTH EVERY NIGHT AT BEDTIME AS NEEDED      albuterol (PROVENTIL/VENTOLIN HFA) 90 mcg/actuation inhaler Inhale 1-2 puffs into the lungs every 6 (six) hours as needed for Wheezing. Rescue 1 g 1    famotidine (PEPCID) 20 MG tablet Take 1 tablet (20 mg total) by mouth 2 (two) times daily. 20 tablet 0    nicotine (NICODERM CQ) 21 mg/24 hr Place 1 patch onto the skin once daily. 14 patch 0    nicotine, polacrilex, (NICORETTE) 2 mg Gum Chew and park 1 gum by mouth as needed for cravings.  Do not exceed more than 15 pieces in a 24 hour period 380 each 0     No current facility-administered medications for this visit.       Past Medical History:   Diagnosis Date    GERD (gastroesophageal reflux disease)     Osteopenia     Thyroid  "disease     partial thyroidectomy (60%) no meds     Past Surgical History:   Procedure Laterality Date    APPENDECTOMY       SECTION      HYSTERECTOMY      KNEE SURGERY Left 2013    meniscus repair by Dr Barros    THYROIDECTOMY      "60% removed"     Family History       Problem Relation (Age of Onset)    Cancer Father    Heart disease Maternal Grandmother    Hypertension Mother, Maternal Grandmother          Social History     Socioeconomic History    Marital status:    Tobacco Use    Smoking status: Every Day     Current packs/day: 1.00     Types: Cigarettes    Smokeless tobacco: Never   Substance and Sexual Activity    Alcohol use: Yes     Comment: SOCIALLY    Drug use: No    Sexual activity: Yes   Social History Narrative    , M x 9, 1 child, smoker.       Review of Systems  14 point ROS was negative    OBJECTIVE:     Vital Signs  Pulse: 74  BP: (!) 144/91  Pain Score:   8  There is no height or weight on file to calculate BMI.      Physical Exam:    Constitutional: She appears well-developed and well-nourished.     Eyes: Pupils are equal, round, and reactive to light.     Cardiovascular: Normal rate and regular rhythm.     Abdominal: Soft.     Psych/Behavior: She is alert. She is oriented to person, place, and time. She has a normal mood and affect.     Musculoskeletal: Gait is normal.        Neck: Range of motion is limited.        Back: Range of motion is limited.        Right Upper Extremities: Muscle strength is 5/5.        Left Upper Extremities: Muscle strength is 5/5.       Right Lower Extremities: Muscle strength is 5/5.        Left Lower Extremities: Muscle strength is 5/5.     Neurological:        Coordination: She has abnormal tandem walking coordination. She has a normal Romberg Test.        Sensory: There is no sensory deficit in the trunk. There is no sensory deficit in the extremities.        DTRs: DTRs are DTRS NORMAL AND SYMMETRICnormal and symmetric.      "   Cranial nerves: Cranial nerve(s) II, III, IV, V, VI, VII, VIII, IX, X, XI and XII are intact.     +TTP at lower lumbar facets right and right SI joint.    Diagnostic Results:  MRI thoracic w and w/o: small mid thoracic syrinx.  No enhancement, no stenosis.  MRI L spine: moderate left sided foraminal stenosis at L4/5, 5/S1.  No significant central canal stenosis or facet arthropathy.  EMG chronic bilateral L5,S1 radiculopathies  Reviewed    ASSESSMENT/PLAN:   53 F smoker who presents with chronic axial low back pain and RLE radicular pain.  Her imaging is described above and shows no significant stenosis to correlate with her current symptoms.  Will plan to get dynamic xrays and CT L spine.  Will attempt to place referral to aquatic therapy in [facility].  Fu once completed.

## 2024-02-20 ENCOUNTER — TELEPHONE (OUTPATIENT)
Dept: NEUROSURGERY | Facility: CLINIC | Age: 54
End: 2024-02-20

## 2024-02-23 ENCOUNTER — PATIENT MESSAGE (OUTPATIENT)
Dept: NEUROSURGERY | Facility: CLINIC | Age: 54
End: 2024-02-23